# Patient Record
Sex: FEMALE | Race: WHITE | Employment: FULL TIME | URBAN - METROPOLITAN AREA
[De-identification: names, ages, dates, MRNs, and addresses within clinical notes are randomized per-mention and may not be internally consistent; named-entity substitution may affect disease eponyms.]

---

## 2021-01-08 ENCOUNTER — HOSPITAL ENCOUNTER (EMERGENCY)
Age: 36
Discharge: HOME | End: 2021-01-08
Attending: EMERGENCY MEDICINE
Payer: COMMERCIAL

## 2021-01-08 VITALS
HEART RATE: 94 BPM | OXYGEN SATURATION: 98 % | WEIGHT: 160 LBS | DIASTOLIC BLOOD PRESSURE: 83 MMHG | SYSTOLIC BLOOD PRESSURE: 129 MMHG | TEMPERATURE: 98.6 F | BODY MASS INDEX: 32.25 KG/M2 | HEIGHT: 59 IN | RESPIRATION RATE: 14 BRPM
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
## 2021-01-08 DIAGNOSIS — S39.92XA INJURY OF COCCYX, INITIAL ENCOUNTER: Primary | ICD-10-CM

## 2021-01-08 PROCEDURE — 99283 EMERGENCY DEPT VISIT LOW MDM: CPT

## 2021-01-08 RX ORDER — METHOCARBAMOL 750 MG/1
750 TABLET, FILM COATED ORAL 4 TIMES DAILY
Qty: 40 TABLET | Refills: 0 | Status: SHIPPED | OUTPATIENT
Start: 2021-01-08 | End: 2021-01-18

## 2021-01-08 RX ORDER — HYDROCODONE BITARTRATE AND ACETAMINOPHEN 5; 325 MG/1; MG/1
1 TABLET ORAL EVERY 8 HOURS PRN
Qty: 5 TABLET | Refills: 0 | Status: SHIPPED | OUTPATIENT
Start: 2021-01-08 | End: 2021-01-11

## 2021-01-08 RX ORDER — IBUPROFEN 600 MG/1
600 TABLET ORAL EVERY 6 HOURS PRN
Qty: 120 TABLET | Refills: 0 | Status: SHIPPED | OUTPATIENT
Start: 2021-01-08 | End: 2022-10-10

## 2021-01-08 RX ORDER — IBUPROFEN 800 MG/1
800 TABLET ORAL EVERY 6 HOURS PRN
COMMUNITY
End: 2021-01-08

## 2021-01-08 ASSESSMENT — PAIN DESCRIPTION - PAIN TYPE: TYPE: ACUTE PAIN

## 2021-01-08 NOTE — ED PROVIDER NOTES
Emergency Department Encounter    Patient: Sal Frye  MRN: [id number]  : 1985  Date of Evaluation: 2021  ED Provider:  Felisa Rodrigez    Triage Chief Complaint:   Fall and Tailbone Pain    [place]:  Sal Frye is a 37 y.o. female that presents with tailbone pain. Patient states that several days ago she fell down the stairs after slipping on the carpet and went down multiple stairs on her bottom. Denies hitting her head or any loss consciousness. Is complaining of pain to her tailbone area when she sits or puts pressure on that area. Denies any pain anywhere else. ROS - see HPI, below listed is current ROS at time of my eval:  General:  No fevers, no chills, no weakness  Eyes:  No recent vison changes, no discharge  ENT:  No sore throat, no nasal congestion, no hearing changes  Cardiovascular:  No chest pain, no palpitations  Respiratory:  No shortness of breath, no cough, no wheezing  Gastrointestinal:  No pain, no nausea, no vomiting, no diarrhea  Musculoskeletal:  + tail bone pain  Skin:  No rash, no pruritis, no easy bruising  Neurologic:  No speech problems, no headache, no extremity numbness, no extremity tingling, no extremity weakness  Psychiatric:  No anxiety  Genitourinary:  No dysuria, no hematuria  Endocrine:  No unexpected weight gain, no unexpected weight loss  Extremities:  no edema, no pain    Past Medical History:   Diagnosis Date    Heart murmur      Past Surgical History:   Procedure Laterality Date    BRAIN SURGERY       History reviewed. No pertinent family history.   Social History     Socioeconomic History    Marital status: Single     Spouse name: Not on file    Number of children: Not on file    Years of education: Not on file    Highest education level: Not on file   Occupational History    Not on file   Social Needs    Financial resource strain: Not on file    Food insecurity     Worry: Not on file     Inability: Not on file   [employer] needs Medical: Not on file     Non-medical: Not on file   Tobacco Use    Smoking status: Current Every Day Smoker     Packs/day: 0.50    Smokeless tobacco: Never Used   Substance and Sexual Activity    Alcohol use: Yes     Comment: SOCIAL    Drug use: Never    Sexual activity: Not on file   Lifestyle    Physical activity     Days per week: Not on file     Minutes per session: Not on file    Stress: Not on file   Relationships    Social connections     Talks on phone: Not on file     Gets together: Not on file     Attends Yazidi service: Not on file     Active member of club or organization: Not on file     Attends meetings of clubs or organizations: Not on file     Relationship status: Not on file    Intimate partner violence     Fear of current or ex partner: Not on file     Emotionally abused: Not on file     Physically abused: Not on file     Forced sexual activity: Not on file   Other Topics Concern    Not on file   Social History Narrative    Not on file     No current facility-administered medications for this encounter. Current Outpatient Medications   Medication Sig Dispense Refill    CITALOPRAM HYDROBROMIDE PO Take by mouth      ARIPiprazole (ABILIFY PO) Take by mouth      ibuprofen (ADVIL;MOTRIN) 800 MG tablet Take 800 mg by mouth every 6 hours as needed for Pain       Allergies   Allergen Reactions    Codeine        Nursing Notes Reviewed    Physical Exam:  Triage VS:    ED Triage Vitals [01/08/21 1412]   Enc Vitals Group      /83      Pulse 94      Resp 14      Temp 98.6 °F (37 °C)      Temp src       SpO2 98 %      Weight 160 lb (72.6 kg)      Height 4' 11\" (1.499 m)      Head Circumference       Peak Flow       Pain Score       Pain Loc       Pain Edu? Excl. in [address]? My pulse ox interpretation is - normal    General appearance:  No acute distress. Skin:  Warm. Dry. Eye:  Extraocular movements intact.      Ears, nose, mouth and throat:  Oral mucosa moist   Neck: Trachea midline. Head is atraumatic. Extremity:  No swelling. Normal ROM     Heart:  Regular rate and rhythm, normal S1 & S2, no extra heart sounds. Perfusion:  intact  Respiratory:  Lungs clear to auscultation bilaterally. Respirations nonlabored. Abdominal:  Normal bowel sounds. Soft. Nontender. Non distended. Back:  No CVA tenderness to palpation , no point tenderness of the spine, tenderness to the coccyx area with no sign rounding ecchymoses or swelling. Neurological:  Alert and oriented times 3. No focal neuro deficits. Psychiatric:  Appropriate    I have reviewed and interpreted all of the currently available lab results from this visit (if applicable):  No results found for this visit on 01/08/21. Radiographs (if obtained):  Radiologist's Report Reviewed:  No results found. EKG (if obtained): (All EKG's are interpreted by myself in the absence of a cardiologist)      MDM:  Patient presents with complaint of pain to her tailbone area. Has tenderness palpation of the coccyx with no sign of swelling or ecchymoses. Patient is well-appearing, nontoxic. Has no point tenderness to her spine. No neurologic deficits. Did not hit her head or any loss of consciousness. Patient will be treated symptomatically. Told to buy a flotation device from the store to uses a donut ring to relieve the pressure to her coccyx area while sitting. Clinical Impression:  1. Injury of coccyx, initial encounter      Disposition referral (if applicable):  No follow-up provider specified.   Disposition medications (if applicable):  New Prescriptions    No medications on file     ED Provider Disposition Time  DISPOSITION Decision To Discharge 01/08/2021 02:27:56 PM      Comment: Please note this report has been produced using speech recognition software and may contain errors related to that system including errors in grammar, punctuation, and spelling, as well as words and phrases that may be inappropriate. Efforts were made to edit the dictations.        Betty Ulloa MD  01/08/21 Rene [place]

## 2021-07-04 ENCOUNTER — HOSPITAL ENCOUNTER (EMERGENCY)
Age: 36
Discharge: HOME | End: 2021-07-04
Attending: EMERGENCY MEDICINE
Payer: COMMERCIAL

## 2021-07-04 VITALS
HEART RATE: 85 BPM | TEMPERATURE: 98.2 F | HEIGHT: 55 IN | RESPIRATION RATE: 17 BRPM | SYSTOLIC BLOOD PRESSURE: 141 MMHG | DIASTOLIC BLOOD PRESSURE: 57 MMHG | BODY MASS INDEX: 4499.69 KG/M2

## 2021-07-04 DIAGNOSIS — R59.0 LYMPHADENOPATHY OF RIGHT CERVICAL REGION: Primary | ICD-10-CM

## 2021-07-04 PROCEDURE — 99284 EMERGENCY DEPT VISIT MOD MDM: CPT

## 2021-07-04 ASSESSMENT — PAIN SCALES - GENERAL: PAINLEVEL_OUTOF10: 10

## 2021-07-04 ASSESSMENT — PAIN DESCRIPTION - LOCATION: LOCATION: NECK

## 2021-07-04 NOTE — ED PROVIDER NOTES
EMERGENCY DEPARTMENT ENCOUNTER      CHIEF COMPLAINT:   Lump on posterior neck    HPI: Ashwin Villalobos is a 31 y.o. female who presents to the emergency department for evaluation of a painful lump on the back of her right neck just below the hairline. The patient states she noticed it 2 days ago. It is painful. She denies any trauma to the area. There is no drainage or redness. She denies any scalp injuries or pimples. She does not know what the lump is. She is concerned that it could be an abscess. She states it is achy and throbbing. The pain is constant. It is worse if you touch it and better if you do not. She denies fevers, chills, chest pain, shortness of breath, throat pain, cough, vomiting or any other complaints. REVIEW OF SYSTEMS:   Constitutional:  Denies fever or chills  Eyes:  Denies change in visual acuity  HENT: See HPI  Respiratory:  Denies cough or shortness of breath  Cardiovascular:  Denies chest pain or edema  GI:  Denies abdominal pain, nausea, vomiting, bloody stools or diarrhea  :  Denies dysuria  Musculoskeletal:  Denies back pain or joint pain  Integument:  Denies rash  Neurologic:  Denies headache, focal weakness or sensory changes  \"Remaining review of systems reviewed and negative. I have reviewed the nursing triage documentation and agree unless otherwise noted below. \"      PAST MEDICAL HISTORY:   Past Medical History:   Diagnosis Date    Heart murmur        CURRENT MEDICATIONS:   Home medications reviewed. SURGICAL HISTORY:   Past Surgical History:   Procedure Laterality Date    BRAIN SURGERY         FAMILY HISTORY:   No family history on file.     SOCIAL HISTORY:   Social History     Socioeconomic History    Marital status: Single     Spouse name: Not on file    Number of children: Not on file    Years of education: Not on file    Highest education level: Not on file   Occupational History    Not on file   Tobacco Use    Smoking status: Current Every Day Smoker     Packs/day: 0.50    Smokeless tobacco: Never Used   Substance and Sexual Activity    Alcohol use: Yes     Comment: SOCIAL    Drug use: Never    Sexual activity: Not on file   Other Topics Concern    Not on file   Social History Narrative    Not on file     Social Determinants of Health     Financial Resource Strain:     Difficulty of Paying Living Expenses:    Food Insecurity:     Worried About Running Out of Food in the Last Year:     [address] in the Last Year:    Transportation Needs:     Lack of Transportation (Medical):  Lack of Transportation (Non-Medical):    Physical Activity:     Days of Exercise per Week:     Minutes of Exercise per Session:    Stress:     Feeling of Stress :    Social Connections:     Frequency of Communication with Friends and Family:     Frequency of Social Gatherings with Friends and Family:     Attends Christianity Services:     Active Member of Clubs or Organizations:     Attends Club or Organization Meetings:     Marital Status:    Intimate Partner Violence:     Fear of Current or Ex-Partner:     Emotionally Abused:     Physically Abused:     Sexually Abused: ALLERGIES: Codeine    PHYSICAL EXAM:  VITAL SIGNS:   ED Triage Vitals [07/04/21 1208]   Enc Vitals Group      BP (!) 141/57      Pulse 85      Resp 17      Temp 98.2 °F (36.8 °C)      Temp src       SpO2       Weight       Height (!) 5\" (0.127 m)      Head Circumference       Peak Flow       Pain Score       Pain Loc       Pain Edu? Excl. in [address]? Constitutional:  Non-toxic appearance  HENT: Normocephalic, Atraumatic, Bilateral external ears normal, Oropharynx moist, No oral exudates, Nose normal.  Eyes:  PERRL, EOMI, Conjunctiva normal, No discharge.   Neck: Normal range of motion, No tenderness, Supple, No stridor, there is a tender occipital lymph node on the right, no abscess, no cellulitis, no drainage  Cardiovascular:  Normal heart rate, Normal clarification.         Brendolyn Cushing, MD  07/04/21 2009

## 2021-07-04 NOTE — ED NOTES
Assessment completed as charted no S/S of distress noted at this time. We will continue to monitor.        Joy Tabares RN  07/04/21 [id number]

## 2021-09-16 ENCOUNTER — HOSPITAL ENCOUNTER (EMERGENCY)
Age: 36
Discharge: HOME | End: 2021-09-16
Attending: EMERGENCY MEDICINE
Payer: COMMERCIAL

## 2021-09-16 VITALS
DIASTOLIC BLOOD PRESSURE: 98 MMHG | RESPIRATION RATE: 15 BRPM | OXYGEN SATURATION: 94 % | SYSTOLIC BLOOD PRESSURE: 156 MMHG | HEIGHT: 60 IN | TEMPERATURE: 97.7 F | HEART RATE: 93 BPM | WEIGHT: 186 LBS | BODY MASS INDEX: 36.52 KG/M2

## 2021-09-16 DIAGNOSIS — J06.9 VIRAL URI WITH COUGH: Primary | ICD-10-CM

## 2021-09-16 PROCEDURE — U0005 INFEC AGEN DETEC AMPLI PROBE: HCPCS

## 2021-09-16 PROCEDURE — U0003 INFECTIOUS AGENT DETECTION BY NUCLEIC ACID (DNA OR RNA); SEVERE ACUTE RESPIRATORY SYNDROME CORONAVIRUS 2 (SARS-COV-2) (CORONAVIRUS DISEASE [COVID-19]), AMPLIFIED PROBE TECHNIQUE, MAKING USE OF HIGH THROUGHPUT TECHNOLOGIES AS DESCRIBED BY CMS-2020-01-R: HCPCS

## 2021-09-16 PROCEDURE — 6360000002 HC RX W HCPCS: Performed by: EMERGENCY MEDICINE

## 2021-09-16 PROCEDURE — 6370000000 HC RX 637 (ALT 250 FOR IP): Performed by: EMERGENCY MEDICINE

## 2021-09-16 PROCEDURE — 99283 EMERGENCY DEPT VISIT LOW MDM: CPT

## 2021-09-16 PROCEDURE — 96372 THER/PROPH/DIAG INJ SC/IM: CPT

## 2021-09-16 RX ORDER — LAMOTRIGINE 25 MG/1
TABLET ORAL
COMMUNITY
Start: 2021-04-19 | End: 2022-10-10

## 2021-09-16 RX ORDER — KETOROLAC TROMETHAMINE 30 MG/ML
15 INJECTION, SOLUTION INTRAMUSCULAR; INTRAVENOUS ONCE
Status: COMPLETED | OUTPATIENT
Start: 2021-09-16 | End: 2021-09-16

## 2021-09-16 RX ORDER — OMEPRAZOLE 40 MG/1
CAPSULE, DELAYED RELEASE ORAL
COMMUNITY
Start: 2021-08-23 | End: 2022-10-10

## 2021-09-16 RX ORDER — ACETAMINOPHEN 500 MG
1000 TABLET ORAL ONCE
Status: COMPLETED | OUTPATIENT
Start: 2021-09-16 | End: 2021-09-16

## 2021-09-16 RX ORDER — DEXAMETHASONE 4 MG/1
4 TABLET ORAL ONCE
Status: COMPLETED | OUTPATIENT
Start: 2021-09-16 | End: 2021-09-16

## 2021-09-16 RX ORDER — DESVENLAFAXINE 25 MG/1
25 TABLET, EXTENDED RELEASE ORAL DAILY
COMMUNITY
Start: 2021-03-18 | End: 2022-10-10

## 2021-09-16 RX ORDER — OXYBUTYNIN CHLORIDE 5 MG/1
TABLET, EXTENDED RELEASE ORAL
COMMUNITY
Start: 2021-08-07 | End: 2022-10-10

## 2021-09-16 RX ADMIN — ACETAMINOPHEN 1000 MG: 500 TABLET ORAL at 12:08

## 2021-09-16 RX ADMIN — DEXAMETHASONE 4 MG: 4 TABLET ORAL at 12:07

## 2021-09-16 RX ADMIN — KETOROLAC TROMETHAMINE 15 MG: 30 INJECTION, SOLUTION INTRAMUSCULAR; INTRAVENOUS at 12:07

## 2021-09-16 ASSESSMENT — PAIN SCALES - GENERAL
PAINLEVEL_OUTOF10: 7
PAINLEVEL_OUTOF10: 6

## 2021-09-16 NOTE — ED NOTES
Patient discharged without prescriptions; education of medications reviewed. Pt verbalized understanding of discharge instructions. All questions answered and patient understands follow up instructions.        Maximilian Bates RN  09/16/21 [id number]

## 2021-09-16 NOTE — ED TRIAGE NOTES
Pt arrived via triage with c/o cough, congestion, sore throat and not feeling well. Pt is alert, oriented and ambulatory. Pt respirations are even and unlabored. Pt skin is warm, dry and intact.

## 2021-09-16 NOTE — ED PROVIDER NOTES
Emergency Department Encounter    Patient: Rashi Dias  MRN: [id number]  : 1985  Date of Evaluation: 2021  ED Provider:  Jose Blulard MD    Triage Chief Complaint:   Cough and Nasal Congestion    [place]:  Rashi Dias is a 31 y.o. female that presents with cough, congestion, runny nose, sore throat, general malaise x2 days. No fever or chills. No shortness of breath. Patient smokes approximate 1 pack/day. Patient reports that she is down to just 1 or 2 cigarettes/day due to her symptoms. No chest pain. Patient denies any recent exposure to anybody with Covid-19. Patient is not immunized. ROS - see HPI, below listed is current ROS at time of my eval:  General:  No fevers, + chills, no weakness  Eyes:  No recent vison changes, no discharge  ENT:  + sore throat, + nasal congestion, no hearing changes  Cardiovascular:  No chest pain, no palpitations  Respiratory:  No shortness of breath, + cough, no wheezing  Gastrointestinal:  No pain, no nausea, no vomiting, no diarrhea  Musculoskeletal:  No muscle pain  Skin:  No rash,   Neurologic:  No speech problems, no headache  Genitourinary:  No dysuria  Extremities:  no edema, no pain    Past Medical History:   Diagnosis Date    Heart murmur      Past Surgical History:   Procedure Laterality Date    BRAIN SURGERY       History reviewed. No pertinent family history.   Social History     Socioeconomic History    Marital status: Single     Spouse name: Not on file    Number of children: Not on file    Years of education: Not on file    Highest education level: Not on file   Occupational History    Not on file   Tobacco Use    Smoking status: Current Every Day Smoker     Packs/day: 0.50    Smokeless tobacco: Never Used   Substance and Sexual Activity    Alcohol use: Yes     Comment: SOCIAL    Drug use: Never    Sexual activity: Not on file   Other Topics Concern    Not on file   Social History Narrative    Not on file Social Determinants of Health     Financial Resource Strain:     Difficulty of Paying Living Expenses:    Food Insecurity:     Worried About Running Out of Food in the Last Year:     [address] in the Last Year:    Transportation Needs:     Lack of Transportation (Medical):      Lack of Transportation (Non-Medical):    Physical Activity:     Days of Exercise per Week:     Minutes of Exercise per Session:    Stress:     Feeling of Stress :    Social Connections:     Frequency of Communication with Friends and Family:     Frequency of Social Gatherings with Friends and Family:     Attends Adventism Services:     Active Member of Clubs or Organizations:     Attends Club or Organization Meetings:     Marital Status:    Intimate Partner Violence:     Fear of Current or Ex-Partner:     Emotionally Abused:     Physically Abused:     Sexually Abused:      Current Facility-Administered Medications   Medication Dose Route Frequency Provider Last Rate Last Admin    acetaminophen (TYLENOL) tablet 1,000 mg  1,000 mg Oral Once Concetta Lewis MD        ketorolac (TORADOL) injection 15 mg  15 mg IntraMUSCular Once Concetta Lewis MD        dexamethasone (DECADRON) tablet 4 mg  4 mg Oral Once Concetta Lewis MD         Current Outpatient Medications   Medication Sig Dispense Refill    desvenlafaxine succinate (PRISTIQ) 25 MG TB24 extended release tablet Take 25 mg by mouth daily      lamoTRIgine (LAMICTAL) 25 MG tablet       oxybutynin (DITROPAN-XL) 5 MG extended release tablet TAKE 1 TABLET BY MOUTH EVERY DAY      omeprazole (PRILOSEC) 40 MG delayed release capsule TAKE 1 CAPSULE BY MOUTH EVERY DAY      CITALOPRAM HYDROBROMIDE PO Take by mouth      ARIPiprazole (ABILIFY PO) Take by mouth      ibuprofen (IBU) 600 MG tablet Take 1 tablet by mouth every 6 hours as needed for Pain 120 tablet 0     Allergies   Allergen Reactions    Codeine        Nursing Notes Reviewed    Physical Exam:  Triage VS: in time range)      patient understands that at this time there is no evidence for another underlying process, however that early in the process of any illness or infection an initial workup/presentation can be falsely reassuring/negative. Based on history, physical exam and discussion with patient, the patient will be treated symptomatically and will be discharged home. An outpatient Covid-19 test was sent. Patient was instructed on symptomatic treatment, monitoring and outpatient followup. They understand and agree with the plan, return warnings given. We have discussed the symptoms which are most concerning that necessitate immediate return. Clinical Impression:  1. Viral URI with cough      Disposition referral (if applicable):  No follow-up provider specified. Disposition medications (if applicable):  New Prescriptions    No medications on file     ED Provider Disposition Time  DISPOSITION      Patient Instructions                 You were evaluated for signs and symptoms consistent with an upper respiratory tract infection.  The symptoms could also be consistent with Covid-19.  I do recommend self-isolation.  An outpatient Covid-19 test was sent which will be available on [facility] in the next 24 to 48 hours. I do recommend over-the-counter acetaminophen 1000 mg every 8 hours as needed for pain/general malaise/fever. Do not take more than 4000 mg in a 24-hour period.  You can also take over-the-counter ibuprofen 600 mg every 8 hours as needed for pain/general malaise/fever.  Do not take ibuprofen with any other nonsteroidal anti-inflammatory such as naproxen sodium/Aleve. Return to the emergency department for any medical concerns. Comment: Please note this report has been produced using speech recognition software and may contain errors related to that system including errors in grammar, punctuation, and spelling, as well as words and phrases that may be inappropriate.   Efforts were made to edit the dictations.         Diane Guerra MD  09/16/21 [id number]

## 2021-09-17 ENCOUNTER — CARE COORDINATION (OUTPATIENT)
Dept: CARE COORDINATION | Age: 36
End: 2021-09-17

## 2021-09-17 LAB
SARS-COV-2: DETECTED
SOURCE: ABNORMAL

## 2021-09-17 NOTE — CARE COORDINATION
Patient contacted regarding COVID-19 diagnosis. Discussed COVID-19 related testing which was pending at this time. Test results were pending. Patient informed of results, if available? Yes. Ambulatory Care Manager contacted the patient by telephone to perform post discharge assessment. Call within 2 business days of discharge: Yes. Verified name and  with patient as identifiers. Provided introduction to self, and explanation of the CTN/ACM role, and reason for call due to risk factors for infection and/or exposure to COVID-19. Symptoms reviewed with patient who verbalized the following symptoms: cough. Due to no new or worsening symptoms encounter was not routed to provider for escalation. Discussed follow-up appointments. If no appointment was previously scheduled, appointment scheduling offered: Yes. [facility] follow up appointment(s): No future appointments. Non-[facility] follow up appointment(s): will call     Non-face-to-face services provided:  Reviewed and followed up on pending diagnostic tests and treatments-covid      Advance Care Planning:   Does patient have an Advance Directive:  reviewed and current. Educated patient about risk for severe COVID-19 due to risk factors according to CDC guidelines. ACM reviewed discharge instructions, medical action plan and red flag symptoms with the patient who verbalized understanding. Discussed COVID vaccination status: Yes. Education provided on COVID-19 vaccination as appropriate. Discussed exposure protocols and quarantine with CDC Guidelines. Patient was given an opportunity to verbalize any questions and concerns and agrees to contact ACM or health care provider for questions related to their healthcare. Reviewed and educated patient on any new and changed medications related to discharge diagnosis     Was patient discharged with a pulse oximeter?  No Discussed and confirmed pulse oximeter discharge instructions and when to notify provider or seek emergency care. ACM provided contact information. No further follow-up call identified based on severity of symptoms and risk factors. Spoke with pt who said she still has cough she will call her pcp to her follow. Spoke with pt who said she is doing a little better she is trying to get on mychart encouraged her to do so to get her results. She does have a pcp she can follow up with. She is considering getting vaccinated.

## 2021-09-17 NOTE — ACP (ADVANCE CARE PLANNING)
Advance Care Planning   Healthcare Decision Maker:He        Click here to complete Healthcare Decision Makers including selection of the Healthcare Decision Maker Relationship (ie \"Primary\"). Today we documented Decision Maker(s) consistent with Legal Next of Kin hierarchy.

## 2021-09-28 ENCOUNTER — HOSPITAL ENCOUNTER (EMERGENCY)
Age: 36
Discharge: HOME | End: 2021-09-28
Attending: EMERGENCY MEDICINE
Payer: COMMERCIAL

## 2021-09-28 ENCOUNTER — APPOINTMENT (OUTPATIENT)
Dept: GENERAL RADIOLOGY | Age: 36
End: 2021-09-28
Payer: COMMERCIAL

## 2021-09-28 VITALS
BODY MASS INDEX: 37.69 KG/M2 | OXYGEN SATURATION: 93 % | RESPIRATION RATE: 18 BRPM | WEIGHT: 192 LBS | SYSTOLIC BLOOD PRESSURE: 116 MMHG | HEART RATE: 101 BPM | TEMPERATURE: 97.1 F | DIASTOLIC BLOOD PRESSURE: 82 MMHG | HEIGHT: 60 IN

## 2021-09-28 DIAGNOSIS — R06.00 DYSPNEA, UNSPECIFIED TYPE: ICD-10-CM

## 2021-09-28 DIAGNOSIS — F10.920 ACUTE ALCOHOLIC INTOXICATION WITHOUT COMPLICATION (HCC): Primary | ICD-10-CM

## 2021-09-28 LAB
EKG ATRIAL RATE: 103 BPM
EKG DIAGNOSIS: NORMAL
EKG P AXIS: 66 DEGREES
EKG P-R INTERVAL: 142 MS
EKG Q-T INTERVAL: 344 MS
EKG QRS DURATION: 78 MS
EKG QTC CALCULATION (BAZETT): 450 MS
EKG R AXIS: 50 DEGREES
EKG T AXIS: 54 DEGREES
EKG VENTRICULAR RATE: 103 BPM

## 2021-09-28 PROCEDURE — 93010 ELECTROCARDIOGRAM REPORT: CPT | Performed by: INTERNAL MEDICINE

## 2021-09-28 PROCEDURE — 71046 X-RAY EXAM CHEST 2 VIEWS: CPT

## 2021-09-28 PROCEDURE — 93005 ELECTROCARDIOGRAM TRACING: CPT | Performed by: EMERGENCY MEDICINE

## 2021-09-28 PROCEDURE — 99283 EMERGENCY DEPT VISIT LOW MDM: CPT

## 2021-09-28 ASSESSMENT — PAIN DESCRIPTION - FREQUENCY: FREQUENCY: CONTINUOUS

## 2021-09-28 ASSESSMENT — PAIN DESCRIPTION - ORIENTATION: ORIENTATION: UPPER

## 2021-09-28 ASSESSMENT — PAIN DESCRIPTION - ONSET: ONSET: ON-GOING

## 2021-09-28 ASSESSMENT — PAIN SCALES - GENERAL: PAINLEVEL_OUTOF10: 5

## 2021-09-28 ASSESSMENT — PAIN DESCRIPTION - LOCATION: LOCATION: BACK;NECK

## 2021-09-28 ASSESSMENT — PAIN DESCRIPTION - PAIN TYPE: TYPE: ACUTE PAIN

## 2021-09-28 NOTE — ED TRIAGE NOTES
Pt arrived via EMS for c/o shortness of breath d/t COVID-19. Pt reports being dx two weeks ago with COVID-19. Pt reports to drinking unknown amount of alcoholic beverages last night and is still intoxicated. Pt is A&O and reports pain in her neck and upper back 5/10 on pain scale.

## 2021-09-28 NOTE — ED NOTES
Pt called for a ride home and states they are on their way  Discharge instructions given  verbalized understanding pt is ambulatory out        Misty Ricci RN  09/28/21 [id number]

## 2021-09-28 NOTE — ED PROVIDER NOTES
Emergency Department Encounter    Patient: Sarai Maravilla  MRN: [id number]  : 1985  Date of Evaluation: 2021  ED Provider:  Christopher Bailey MD    Triage Chief Complaint:   Shortness of Breath      [place]:  Sarai Maravilla is a 31 y.o. female that presents with SOB and alcohol intoxication. Patient smokes 1/2 Pack per day. No chest pain, Abdominal pain, nausea, vomiting or diarrhea. No history of pulmonary embolus. Patient denies any lower extremity edema or pain. No history of DVT or PE. No recent trauma or surgeries. No recent long periods of inactivity. She denies being on any hormone replacement therapy or contraception. Patient denies any cocaine use or abuse. No methamphetamine use or abuse. Patient reports she is only had mild improvement since being diagnosed with Covid-19. Patient denies any fever or chills. ROS - see HPI, below listed is current ROS at time of my eval:  General:  No fevers  Heart: No chest pain lungs:  Lungs: No shortness of breath no cough  Abdomen: No abdominal pain, nausea, vomiting or diarrhea  Musculoskeletal: Right foot pain. Skin:  No rash, no pruritis, no easy bruising  Neurologic:   no extremity numbness, no extremity tingling, no extremity weakness  Extremities: No edema  : No pain with urination no increase in urgency or frequency of urination  Psych: No anxiety or depression; no suicidal ideations or homicidal ideations      Past Medical History:   Diagnosis Date    Heart murmur      Past Surgical History:   Procedure Laterality Date    BRAIN SURGERY       History reviewed. No pertinent family history.   Social History     Socioeconomic History    Marital status: Single     Spouse name: Not on file    Number of children: Not on file    Years of education: Not on file    Highest education level: Not on file   Occupational History    Not on file   Tobacco Use    Smoking status: Current Every Day Smoker     Packs/day: 0.50     Types: Cigarettes    Smokeless tobacco: Never Used   Vaping Use    Vaping Use: Never used   Substance and Sexual Activity    Alcohol use: Yes     Comment: SOCIAL    Drug use: Yes     Types: Marijuana    Sexual activity: Not on file   Other Topics Concern    Not on file   Social History Narrative    Not on file     Social Determinants of Health     Financial Resource Strain:     Difficulty of Paying Living Expenses:    Food Insecurity:     Worried About Running Out of Food in the Last Year:     [address] in the Last Year:    Transportation Needs:     Lack of Transportation (Medical):  Lack of Transportation (Non-Medical):    Physical Activity:     Days of Exercise per Week:     Minutes of Exercise per Session:    Stress:     Feeling of Stress :    Social Connections:     Frequency of Communication with Friends and Family:     Frequency of Social Gatherings with Friends and Family:     Attends Holiness Services:     Active Member of Clubs or Organizations:     Attends Club or Organization Meetings:     Marital Status:    Intimate Partner Violence:     Fear of Current or Ex-Partner:     Emotionally Abused:     Physically Abused:     Sexually Abused:      No current facility-administered medications for this encounter.      Current Outpatient Medications   Medication Sig Dispense Refill    desvenlafaxine succinate (PRISTIQ) 25 MG TB24 extended release tablet Take 25 mg by mouth daily      lamoTRIgine (LAMICTAL) 25 MG tablet       oxybutynin (DITROPAN-XL) 5 MG extended release tablet TAKE 1 TABLET BY MOUTH EVERY DAY      omeprazole (PRILOSEC) 40 MG delayed release capsule TAKE 1 CAPSULE BY MOUTH EVERY DAY      CITALOPRAM HYDROBROMIDE PO Take by mouth      ARIPiprazole (ABILIFY PO) Take by mouth      ibuprofen (IBU) 600 MG tablet Take 1 tablet by mouth every 6 hours as needed for Pain 120 tablet 0     Allergies   Allergen Reactions    Codeine        Nursing Notes Reviewed    Physical Exam:  Triage VS:    ED Triage Vitals [09/28/21 0539]   Enc Vitals Group      /87      Pulse 110      Resp 20      Temp 97.1 °F (36.2 °C)      Temp Source Infrared      SpO2 95 %      Weight 192 lb (87.1 kg)      Height 5' (1.524 m)      Head Circumference       Peak Flow       Pain Score       Pain Loc       Pain Edu? Excl. in [address]? My pulse ox interpretation is - normal    General appearance:  Uncooperative  Skin:  Warm. Dry. Eye:  Extraocular movements intact. Bilateral conjunctival injection noted, pupils equal, round, slightly dilated  Ears, nose, mouth and throat:  Oral mucosa moist   Neck:  Trachea midline. Extremity:  No swelling. Normal ROM     Heart:  Regular rate and rhythm  Perfusion:  intact   Respiratory:  Lungs clear to auscultation bilaterally. Respirations nonlabored. Abdominal:  Normal bowel sounds. Soft. Nontender. Non distended. Neurological:  Alert. Moves all extremities equally, cranial nerves grossly intact, smells of alcohol, slurring speech, no facial droop  Psychiatric:  Uncooperative, belligerent and threatening to staff    I have reviewed and interpreted all of the currently available lab results from this visit (if applicable):  Results for orders placed or performed during the hospital encounter of 09/28/21   EKG 12 Lead   Result Value Ref Range    Ventricular Rate 103 BPM    Atrial Rate 103 BPM    P-R Interval 142 ms    QRS Duration 78 ms    Q-T Interval 344 ms    QTc Calculation (Bazett) 450 ms    P Axis 66 degrees    R Axis 50 degrees    T Axis 54 degrees    Diagnosis       Sinus tachycardia  Otherwise normal ECG  No previous ECGs available        Radiographs (if obtained):  Radiologist's Report Reviewed:  No results found. EKG done at 0 612  Rate 103  Normal axis  Normal intervals  No specific ST or T wave changes    Sinus tachycardia rate 103 with no specific ST or T wave changes.     MDM:  Patient presenting Via EMS with alcohol intoxication; Patient reports she was Diagnosed with COVID-19 on 9/16/2021. She had some SOB while falling asleep. On evaluation the patient reported vague shortness of breath while she was trying to fall asleep. She is tearful and says that she has increased stress but denies  suicidal, homicidal, or any threat to harming themselves or others. There is no evidence of trauma. The patient is not an acute threat to themselves or others. Chest x-ray shows no acute cardiopulmonary process. EKG shows no acute findings. I do not feel she has signs of PE or other concerning pulmonary process. She is not hypoxic. Patient is scheduled for a repeat Covid at [facility] today. She was monitored for an extended period of time for complications and discharged with her /sober safe ride. Clinical Impression:  1. Acute alcoholic intoxication without complication ([address]    2. Dyspnea, unspecified type      Disposition referral (if applicable):  No follow-up provider specified. Disposition medications (if applicable):  New Prescriptions    No medications on file     ED Provider Disposition Time  DISPOSITION        Comment: Please note this report has been produced using speech recognition software and may contain errors related to that system including errors in grammar, punctuation, and spelling, as well as words and phrases that may be inappropriate. Efforts were made to edit the dictations.           Jase Alarcon MD  09/28/21 5804

## 2021-09-29 ENCOUNTER — CARE COORDINATION (OUTPATIENT)
Dept: CARE COORDINATION | Age: 36
End: 2021-09-29

## 2021-09-30 ENCOUNTER — CARE COORDINATION (OUTPATIENT)
Dept: CARE COORDINATION | Age: 36
End: 2021-09-30

## 2021-09-30 NOTE — CARE COORDINATION
[facility] ED Follow Up Call    2021    Patient: Alia Boogie Patient : 1985   MRN: <[id number]>  Reason for Admission:   COVID+  Discharge Date: 21      Patient contacted regarding COVID-19 diagnosis. Discussed COVID-19 related testing which was available at this time. Test results were positive. Patient informed of results, if available? Yes. Ambulatory Care Manager contacted the patient by telephone to perform post discharge assessment. Call within 2 business days of discharge: Yes. Verified name and  with patient as identifiers. Provided introduction to self, and explanation of the CTN/ACM role, and reason for call due to risk factors for infection and/or exposure to COVID-19. Symptoms reviewed with patient who verbalized the following symptoms: fatigue and cough. Reports that she is feeling better. Encouraged rest/ hydration. Reviewed s/s to report to MD/Jesus. Due to no new or worsening symptoms encounter was not routed to provider for escalation. Discussed follow-up appointments. If no appointment was previously scheduled, appointment scheduling offered: Yes. [facility] follow up appointment(s): No future appointments. Non-[facility] follow up appointment(s):  Patient verbalized plan to follow up. Non-face-to-face services provided:  Education of patient/family/caregiver/guardian to support self-management-1     Advance Care Planning:   Does patient have an Advance Directive:  not on file. Educated patient about risk for severe COVID-19 due to risk factors according to CDC guidelines. ACM reviewed discharge instructions, medical action plan and red flag symptoms with the patient who verbalized understanding. Discussed COVID vaccination status: Yes. Education provided on COVID-19 vaccination as appropriate. Discussed exposure protocols and quarantine with CDC Guidelines.  Patient was given an opportunity to verbalize any questions and concerns and agrees to contact ACM or health care provider for questions related to their healthcare. Reviewed and educated patient on any new and changed medications related to discharge diagnosis. No new medications noted. Was patient discharged with a pulse oximeter? No.  Discussed discharge instructions and when to notify provider or seek emergency care. Patient denies any questions or concerns. ACM provided contact information. No further follow-up call identified based on severity of symptoms and risk factors.        Care Transitions ED Follow Up    Care Transitions Interventions

## 2021-11-10 ENCOUNTER — HOSPITAL ENCOUNTER (EMERGENCY)
Age: 36
Discharge: HOME | End: 2021-11-10
Attending: EMERGENCY MEDICINE
Payer: COMMERCIAL

## 2021-11-10 VITALS
OXYGEN SATURATION: 97 % | TEMPERATURE: 97 F | BODY MASS INDEX: 35.34 KG/M2 | SYSTOLIC BLOOD PRESSURE: 170 MMHG | HEIGHT: 60 IN | DIASTOLIC BLOOD PRESSURE: 112 MMHG | RESPIRATION RATE: 16 BRPM | WEIGHT: 180 LBS | HEART RATE: 64 BPM

## 2021-11-10 DIAGNOSIS — R03.0 ELEVATED BLOOD PRESSURE READING: ICD-10-CM

## 2021-11-10 DIAGNOSIS — L02.01 FACIAL ABSCESS: Primary | ICD-10-CM

## 2021-11-10 PROCEDURE — 6370000000 HC RX 637 (ALT 250 FOR IP): Performed by: EMERGENCY MEDICINE

## 2021-11-10 PROCEDURE — 99283 EMERGENCY DEPT VISIT LOW MDM: CPT

## 2021-11-10 RX ORDER — CEPHALEXIN 250 MG/1
500 CAPSULE ORAL ONCE
Status: COMPLETED | OUTPATIENT
Start: 2021-11-10 | End: 2021-11-10

## 2021-11-10 RX ORDER — CEPHALEXIN 500 MG/1
500 CAPSULE ORAL 4 TIMES DAILY
Qty: 40 CAPSULE | Refills: 0 | Status: SHIPPED | OUTPATIENT
Start: 2021-11-10 | End: 2021-11-20

## 2021-11-10 RX ADMIN — CEPHALEXIN 500 MG: 250 CAPSULE ORAL at 15:12

## 2021-11-10 ASSESSMENT — PAIN DESCRIPTION - PAIN TYPE: TYPE: ACUTE PAIN

## 2021-11-10 ASSESSMENT — PAIN SCALES - GENERAL: PAINLEVEL_OUTOF10: 10

## 2021-11-10 ASSESSMENT — PAIN DESCRIPTION - LOCATION: LOCATION: FACE

## 2021-11-10 NOTE — ED PROVIDER NOTES
Emergency Department Encounter  [address]    Patient: Megan Craven  MRN: [id number]  : 1985  Date of Evaluation: 11/10/2021  ED Provider: Octavio Bills MD    Chief Complaint       Chief Complaint   Patient presents with    Abscess     [address] is a 31 y.o. female who presents to the emergency department with 4 days of left focal facial abscess. There is no fever. Patient states she would like antibiotic and does not want the abscess drained. Her tetanus is up-to-date. ROS:     At least 10 systems reviewed and otherwise acutely negative except as in the [address] Past History     Past Medical History:   Diagnosis Date    Heart murmur      Past Surgical History:   Procedure Laterality Date    BRAIN SURGERY       Social History     Socioeconomic History    Marital status: Single     Spouse name: None    Number of children: None    Years of education: None    Highest education level: None   Occupational History    None   Tobacco Use    Smoking status: Current Every Day Smoker     Packs/day: 0.50     Types: Cigarettes    Smokeless tobacco: Never Used   Vaping Use    Vaping Use: Never used   Substance and Sexual Activity    Alcohol use: Yes     Comment: SOCIAL    Drug use: Yes     Types: Marijuana Etienne Mustard)    Sexual activity: None   Other Topics Concern    None   Social History Narrative    None     Social Determinants of Health     Financial Resource Strain:     Difficulty of Paying Living Expenses: Not on file   Food Insecurity:     Worried About Running Out of Food in the Last Year: Not on file    [place] of Food in the Last Year: Not on file   Transportation Needs:     Lack of Transportation (Medical): Not on file    Lack of Transportation (Non-Medical):  Not on file   Physical Activity:     Days of Exercise per Week: Not on file    Minutes of Exercise per Session: Not on file   Stress:     Feeling of Stress : Not on file   Social Connections:     Frequency of Communication with Friends and Family: Not on file    Frequency of Social Gatherings with Friends and Family: Not on file    Attends Evangelical Services: Not on file    Active Member of Clubs or Organizations: Not on file    Attends Club or Organization Meetings: Not on file    Marital Status: Not on file   Intimate Partner Violence:     Fear of Current or Ex-Partner: Not on file    Emotionally Abused: Not on file    Physically Abused: Not on file    Sexually Abused: Not on file   Housing Stability:     Unable to Pay for Housing in the Last Year: Not on file    Number of [place] in the Last Year: Not on file    Unstable Housing in the Last Year: Not on file       Medications/Allergies     Discharge Medication List as of 11/10/2021  3:09 PM      CONTINUE these medications which have NOT CHANGED    Details   desvenlafaxine succinate (PRISTIQ) 25 MG TB24 extended release tablet Take 25 mg by mouth dailyHistorical Med      lamoTRIgine (LAMICTAL) 25 MG tablet Historical Med      oxybutynin (DITROPAN-XL) 5 MG extended release tablet TAKE 1 TABLET BY MOUTH EVERY DAYHistorical Med      omeprazole (PRILOSEC) 40 MG delayed release capsule TAKE 1 CAPSULE BY MOUTH EVERY DAYHistorical Med      CITALOPRAM HYDROBROMIDE PO Take by mouthHistorical Med      ARIPiprazole (ABILIFY PO) Take by mouthHistorical Med      ibuprofen (IBU) 600 MG tablet Take 1 tablet by mouth every 6 hours as needed for Pain, Disp-120 tablet, R-0Normal           Allergies   Allergen Reactions    Codeine         Physical Exam       ED Triage Vitals   BP Temp Temp src Pulse Resp SpO2 Height Weight   11/10/21 1338 11/10/21 1336 -- 11/10/21 1336 11/10/21 1336 11/10/21 1336 11/10/21 1336 11/10/21 1336   (!) 172/115 97 °F (36.1 °C)  91 16 97 % 5' (1.524 m) 180 lb (81.6 kg)     GENERAL APPEARANCE: Awake and alert. Cooperative. No acute distress. No obvious discomfort. HEAD: Normocephalic. Atraumatic. EYES: Sclera anicteric. ENT: Tolerates saliva. No trismus. The oropharynx and oral cavity are unremarkable. At the left facial cheek inferiorly is a focal raised mildly erythematous and tender area consistent with an abscess. There is no fluctuance on palpation. It is mildly indurated. NECK: Supple. Trachea midline. CARDIO: RRR. Radial pulse 2+. No audible murmur. LUNGS: Respirations unlabored. CTAB and symmetrical..  ABDOMEN: Soft. Non-distended. Non-tender. No clubbing, edema, cyanosis. EXTREMITIES: No acute deformities. SKIN: Warm and dry. As above otherwise unremarkable. NEUROLOGICAL: No gross facial drooping. Moves all 4 extremities spontaneously. GCS 15. Cranial nerves III to XII grossly intact. Speech is clear. No gross motor or sensory deficits. PSYCHIATRIC: Normal mood. Diagnostics   Labs:  No results found for this visit on 11/10/21. Radiographs:  No results found. Procedures/EKG:   None    ED Course and MDM   In brief, Harsha Nelson is a 31 y.o. female who presented to the emergency department with reported left facial presents for about 4 days. Her tetanus is up-to-date. The patient is started on Keflex. She is to take Tylenol and/or ibuprofen as needed as directed on the container. She is to return to the emergency department for any worsening or concerning symptoms. At the time of disposition, the patient stable no acute distress. She is encouraged to follow-up with primary care provider next 1 to 3 days. ED Medication Orders (From admission, onward)    Start Ordered     Status Ordering Provider    11/10/21 1515 11/10/21 1503  cephALEXin (KEFLEX) capsule 500 mg  ONCE        Question:  Antimicrobial Indications  Answer:  Skin and Soft Tissue Infection    Last MAR action: Given - by Keith Coleman on 11/10/21 at [address], [address] [address]          Final Impression      1. Facial abscess    2.  Elevated blood pressure reading      DISPOSITION Decision To Discharge 11/10/2021 03:05:29 PM     (Please note that portions of this note may have been completed with a voice recognition program. Efforts were made to edit the dictations but occasionally words are mis-transcribed.)    Divina Snyder MD  [address], [place]  11/10/21 2211

## 2022-01-14 ENCOUNTER — HOSPITAL ENCOUNTER (EMERGENCY)
Age: 37
Discharge: HOME | End: 2022-01-14
Attending: EMERGENCY MEDICINE
Payer: COMMERCIAL

## 2022-01-14 VITALS
RESPIRATION RATE: 18 BRPM | HEART RATE: 80 BPM | TEMPERATURE: 97.9 F | SYSTOLIC BLOOD PRESSURE: 142 MMHG | BODY MASS INDEX: 33.38 KG/M2 | DIASTOLIC BLOOD PRESSURE: 108 MMHG | OXYGEN SATURATION: 96 % | WEIGHT: 170 LBS | HEIGHT: 60 IN

## 2022-01-14 DIAGNOSIS — L73.9 FOLLICULITIS: Primary | ICD-10-CM

## 2022-01-14 PROCEDURE — 6370000000 HC RX 637 (ALT 250 FOR IP): Performed by: EMERGENCY MEDICINE

## 2022-01-14 PROCEDURE — 99285 EMERGENCY DEPT VISIT HI MDM: CPT

## 2022-01-14 PROCEDURE — 87252 VIRUS INOCULATION TISSUE: CPT

## 2022-01-14 PROCEDURE — 87253 VIRUS INOCULATE TISSUE ADDL: CPT

## 2022-01-14 RX ORDER — NAPROXEN 500 MG/1
500 TABLET ORAL 2 TIMES DAILY
Qty: 60 TABLET | Refills: 0 | Status: SHIPPED | OUTPATIENT
Start: 2022-01-14 | End: 2022-10-10

## 2022-01-14 RX ORDER — DOXYCYCLINE HYCLATE 100 MG
100 TABLET ORAL 2 TIMES DAILY
Qty: 20 TABLET | Refills: 0 | Status: SHIPPED | OUTPATIENT
Start: 2022-01-14 | End: 2022-01-24

## 2022-01-14 RX ORDER — DOXYCYCLINE HYCLATE 100 MG
100 TABLET ORAL ONCE
Status: COMPLETED | OUTPATIENT
Start: 2022-01-14 | End: 2022-01-14

## 2022-01-14 RX ADMIN — DOXYCYCLINE HYCLATE 100 MG: 100 TABLET, COATED ORAL at 21:52

## 2022-01-14 ASSESSMENT — PAIN DESCRIPTION - PAIN TYPE: TYPE: ACUTE PAIN

## 2022-01-14 ASSESSMENT — PAIN DESCRIPTION - FREQUENCY: FREQUENCY: CONTINUOUS

## 2022-01-14 ASSESSMENT — PAIN DESCRIPTION - DESCRIPTORS: DESCRIPTORS: SORE

## 2022-01-14 ASSESSMENT — PAIN SCALES - GENERAL: PAINLEVEL_OUTOF10: 9

## 2022-01-14 ASSESSMENT — PAIN - FUNCTIONAL ASSESSMENT: PAIN_FUNCTIONAL_ASSESSMENT: PREVENTS OR INTERFERES SOME ACTIVE ACTIVITIES AND ADLS

## 2022-01-14 ASSESSMENT — PAIN DESCRIPTION - LOCATION: LOCATION: BUTTOCKS

## 2022-01-14 ASSESSMENT — PAIN DESCRIPTION - ORIENTATION: ORIENTATION: LEFT

## 2022-01-14 ASSESSMENT — PAIN DESCRIPTION - PROGRESSION: CLINICAL_PROGRESSION: NOT CHANGED

## 2022-01-14 ASSESSMENT — PAIN DESCRIPTION - ONSET: ONSET: ON-GOING

## 2022-01-15 NOTE — ED NOTES
Patient prepared for and ready to be discharged. Patient discharged at this time in no acute distress after verbalizing understanding of discharge instructions. Patient left after receiving After Visit Summary instructions.       Sally Waller RN  01/14/22 7639

## 2022-01-15 NOTE — ED PROVIDER NOTES
Triage Chief Complaint:   Abscess (Patient states has a bump on left buttocx (not observed at [facility]) )    [place]:  Dorian Pham is a 31 y.o. female that presents with concern regarding a lesion on her left buttock. Patient reports \"I think I might have cancer\". Patient developed a sore and lesion on her left buttock near her anus possibly 3 days ago. Lesion has become more painful and patient and  were concerned that it might be a cancer. Patient describes pain is 9 out of 10, constant and localized to this region. Patient is not sure of seeing any drainage from this area. Patient denies any fevers. Patient has never had a problem with this before. ROS:  General:  No fevers, no chills  ENT: No sore throat, no runny nose  Cardiovascular:  No chest pain, no palpitations  Respiratory:  No shortness of breath, no cough  Gastrointestinal:  No pain, no nausea, no vomiting, no diarrhea  : No pain with urinating, no increased frequency urinating  Neurologic:  No numbness, no weakness  Extremities:  No edema, no pain  Skin:  No rash, + lesion near buttock  Psych: No axienty    Past Medical History:   Diagnosis Date    Heart murmur      Past Surgical History:   Procedure Laterality Date    BRAIN SURGERY      HYSTERECTOMY       History reviewed. No pertinent family history.   Social History     Socioeconomic History    Marital status: Single     Spouse name: Not on file    Number of children: Not on file    Years of education: Not on file    Highest education level: Not on file   Occupational History    Not on file   Tobacco Use    Smoking status: Current Every Day Smoker     Packs/day: 0.50     Types: Cigarettes    Smokeless tobacco: Never Used   Vaping Use    Vaping Use: Never used   Substance and Sexual Activity    Alcohol use: Yes     Comment: SOCIAL    Drug use: Yes     Types: Marijuana Deboraha Sanes)    Sexual activity: Yes     Partners: Male   Other Topics Concern    Not on file   Social History Narrative    Not on file     Social Determinants of Health     Financial Resource Strain:     Difficulty of Paying Living Expenses: Not on file   Food Insecurity:     Worried About Running Out of Food in the Last Year: Not on file    Wyatt of Food in the Last Year: Not on file   Transportation Needs:     Lack of Transportation (Medical): Not on file    Lack of Transportation (Non-Medical): Not on file   Physical Activity:     Days of Exercise per Week: Not on file    Minutes of Exercise per Session: Not on file   Stress:     Feeling of Stress : Not on file   Social Connections:     Frequency of Communication with Friends and Family: Not on file    Frequency of Social Gatherings with Friends and Family: Not on file    Attends Hoahaoism Services: Not on file    Active Member of [address] [place] [address] [employer] or Organizations: Not on file    Attends Club or Organization Meetings: Not on file    Marital Status: Not on file   Intimate Partner Violence:     Fear of Current or Ex-Partner: Not on file    Emotionally Abused: Not on file    Physically Abused: Not on file    Sexually Abused: Not on file   Housing Stability:     Unable to Pay for Housing in the Last Year: Not on file    Number of [place] in the Last Year: Not on file    Unstable Housing in the Last Year: Not on file     No current facility-administered medications for this encounter.      Current Outpatient Medications   Medication Sig Dispense Refill    doxycycline hyclate (VIBRA-TABS) 100 MG tablet Take 1 tablet by mouth 2 times daily for 10 days 20 tablet 0    Lidocaine 4 % OINT Apply 1 g topically 2 times daily as needed (pain) 50 g 0    naproxen (NAPROSYN) 500 MG tablet Take 1 tablet by mouth 2 times daily 60 tablet 0    desvenlafaxine succinate (PRISTIQ) 25 MG TB24 extended release tablet Take 25 mg by mouth daily      lamoTRIgine (LAMICTAL) 25 MG tablet       oxybutynin (DITROPAN-XL) 5 MG extended release tablet TAKE 1 TABLET BY MOUTH EVERY DAY      omeprazole (PRILOSEC) 40 MG delayed release capsule TAKE 1 CAPSULE BY MOUTH EVERY DAY      CITALOPRAM HYDROBROMIDE PO Take by mouth      ARIPiprazole (ABILIFY PO) Take by mouth      ibuprofen (IBU) 600 MG tablet Take 1 tablet by mouth every 6 hours as needed for Pain 120 tablet 0     Allergies   Allergen Reactions    Codeine Nausea And Vomiting       Nursing Notes Reviewed    Physical Exam:  ED Triage Vitals [01/14/22 1949]   Enc Vitals Group      BP (!) 152/114      Pulse 83      Resp 18      Temp 97.9 °F (36.6 °C)      Temp Source Oral      SpO2 100 %      Weight 170 lb (77.1 kg)      Height 5' (1.524 m)      Head Circumference       Peak Flow       Pain Score       Pain Loc       Pain Edu? Excl. in [address]? My pulse ox interpretation is - normal    General appearance:  No acute distress. Sitting comfortably in bed. Skin:  Warm. Dry. Lesion as per rectal exam.  Eye:  Extraocular movements intact. Extremity:  No swelling. Normal ROM     Heart:  Regular rate and rhythm  Abdomen:  Soft. Nontender. Nondistended. No rebound or guarding. Rectal: There is a punctate approximately 3 x 3 mm well-circumscribed lesion with central open wound to the 2 o'clock position of the perianal region. There is no surrounding induration or increased warmth. No underlying fluctuance. No active or expressible drainage. This lesion is point tender to palpation. Respiratory:  Respirations nonlabored. Neurological:  Alert and oriented times 3. No focal neuro deficits. I have reviewed and interpreted all of the currently available lab results from this visit (if applicable):  No results found for this visit on 01/14/22.    Radiographs (if obtained):  [] The following radiograph was interpreted by myself in the absence of a radiologist:   [] Radiologist's Report Reviewed:  No orders to display         EKG (if obtained): (All EKG's are interpreted by myself in the absence of a cardiologist)    Chart review shows recent radiographs:  No results found. MDM:  Pt presents as above. Emergent conditions considered. Presentation prompted initial history and physical.  Wound is consistent with a likely inflamed follicle that had gotten infected and had been drained. There is no evidence of any underlying abscess at this time or a necrotizing infectious process. Wound is swabbed for HSV given the location and characterization. Results of swab is pending. I will cover patient with doxycycline with first dose given here. Additionally, lidocaine ointment and naproxen provided for symptomatic treatment. Encourage patient/counseled patient the need for follow-up with her PCP for a recheck to ensure resolution or for further testing should the lesion persist.    I discussed specific signs and symptoms and when to return to the emergency department as well as need for close outpatient follow-up. Questions sought and answered with the patient. They voice understanding and agree with plan. Care of this patient did occur during the COVID-19 pandemic. Clinical Impression:  1.  Folliculitis      Disposition referral (if applicable):  Jeff Tobar MD  [address] [facility] [address] [phone]    Schedule an appointment as soon as possible for a visit       [address]  [phone]  Today  If symptoms worsen    Disposition medications (if applicable):  Discharge Medication List as of 1/14/2022  9:47 PM      START taking these medications    Details   doxycycline hyclate (VIBRA-TABS) 100 MG tablet Take 1 tablet by mouth 2 times daily for 10 days, Disp-20 tablet, R-0Normal      Lidocaine 4 % OINT Apply 1 g topically 2 times daily as needed (pain), Disp-50 g, R-0Normal      naproxen (NAPROSYN) 500 MG tablet Take 1 tablet by mouth 2 times daily, Disp-60 tablet, R-0Normal             Comment: Please note this report has been produced using speech recognition software and may contain errors related to that system including errors in grammar, punctuation, and spelling, as well as words and phrases that may be inappropriate. If there are any questions or concerns please feel free to contact the dictating provider for clarification.          Sumit Watson MD  01/15/22 0993

## 2022-01-17 LAB
FINAL RESULT: NORMAL
PRELIMINARY: NORMAL

## 2022-02-18 ENCOUNTER — HOSPITAL ENCOUNTER (EMERGENCY)
Age: 37
Discharge: HOME | End: 2022-02-18
Attending: EMERGENCY MEDICINE
Payer: COMMERCIAL

## 2022-02-18 ENCOUNTER — APPOINTMENT (OUTPATIENT)
Dept: GENERAL RADIOLOGY | Age: 37
End: 2022-02-18
Payer: COMMERCIAL

## 2022-02-18 VITALS
HEIGHT: 60 IN | DIASTOLIC BLOOD PRESSURE: 117 MMHG | OXYGEN SATURATION: 97 % | TEMPERATURE: 97.8 F | SYSTOLIC BLOOD PRESSURE: 152 MMHG | WEIGHT: 175 LBS | HEART RATE: 88 BPM | BODY MASS INDEX: 34.36 KG/M2 | RESPIRATION RATE: 16 BRPM

## 2022-02-18 DIAGNOSIS — I10 ESSENTIAL HYPERTENSION: ICD-10-CM

## 2022-02-18 DIAGNOSIS — R07.9 CHEST PAIN, UNSPECIFIED TYPE: ICD-10-CM

## 2022-02-18 DIAGNOSIS — R10.30 LOWER ABDOMINAL PAIN: Primary | ICD-10-CM

## 2022-02-18 LAB
BACTERIA: ABNORMAL /HPF
BILIRUBIN URINE: NEGATIVE MG/DL
BLOOD, URINE: ABNORMAL
CAST TYPE: ABNORMAL /HPF
CLARITY: ABNORMAL
COLOR: YELLOW
CRYSTAL TYPE: ABNORMAL /HPF
EKG ATRIAL RATE: 91 BPM
EKG DIAGNOSIS: NORMAL
EKG P AXIS: 61 DEGREES
EKG P-R INTERVAL: 136 MS
EKG Q-T INTERVAL: 336 MS
EKG QRS DURATION: 82 MS
EKG QTC CALCULATION (BAZETT): 413 MS
EKG R AXIS: 53 DEGREES
EKG T AXIS: 52 DEGREES
EKG VENTRICULAR RATE: 91 BPM
EPITHELIAL CELLS, UA: 3 /HPF
GLUCOSE, URINE: NEGATIVE MG/DL
INTERPRETATION: NORMAL
KETONES, URINE: NEGATIVE MG/DL
LEUKOCYTE ESTERASE, URINE: NEGATIVE
NITRITE URINE, QUANTITATIVE: NEGATIVE
PH, URINE: 6 (ref 5–8)
PREGNANCY, URINE: NEGATIVE
PROTEIN UA: NEGATIVE MG/DL
RBC URINE: 5 /HPF (ref 0–6)
SPECIFIC GRAVITY UA: 1.02 (ref 1–1.03)
SPECIFIC GRAVITY, URINE: 1.02 (ref 1–1.03)
UROBILINOGEN, URINE: 0.2 MG/DL (ref 0.2–1)
WBC UA: 2 /HPF (ref 0–5)

## 2022-02-18 PROCEDURE — 81025 URINE PREGNANCY TEST: CPT

## 2022-02-18 PROCEDURE — 93005 ELECTROCARDIOGRAM TRACING: CPT | Performed by: EMERGENCY MEDICINE

## 2022-02-18 PROCEDURE — 87086 URINE CULTURE/COLONY COUNT: CPT

## 2022-02-18 PROCEDURE — 93010 ELECTROCARDIOGRAM REPORT: CPT | Performed by: INTERNAL MEDICINE

## 2022-02-18 PROCEDURE — 81001 URINALYSIS AUTO W/SCOPE: CPT

## 2022-02-18 PROCEDURE — 6370000000 HC RX 637 (ALT 250 FOR IP): Performed by: EMERGENCY MEDICINE

## 2022-02-18 PROCEDURE — 71046 X-RAY EXAM CHEST 2 VIEWS: CPT

## 2022-02-18 PROCEDURE — 99283 EMERGENCY DEPT VISIT LOW MDM: CPT

## 2022-02-18 RX ORDER — ACETAMINOPHEN 325 MG/1
650 TABLET ORAL ONCE
Status: COMPLETED | OUTPATIENT
Start: 2022-02-18 | End: 2022-02-18

## 2022-02-18 RX ADMIN — ACETAMINOPHEN 650 MG: 325 TABLET ORAL at 14:18

## 2022-02-18 ASSESSMENT — PAIN DESCRIPTION - PAIN TYPE: TYPE: ACUTE PAIN

## 2022-02-18 ASSESSMENT — PAIN SCALES - GENERAL: PAINLEVEL_OUTOF10: 9

## 2022-02-18 NOTE — ED PROVIDER NOTES
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 female that presents with complaint of bilateral lower abdominal pain over the last week, no nausea, no vomiting, no diarrhea. She has history of an overactive bladder and is on oxybutynin, so reports that she goes to the bathroom often at baseline. No increase in frequency. No dysuria. No flank pain. No fevers. She also reports she has had chest pain since she started her new job in January, it will be over the left side of her chest, sometimes when she is working or lifting things, sometimes once he is sitting. Feels like pressure and she has to put her hand over her chest and push on it to make it feel better. No associated shortness of breath. No nausea or vomiting. No diaphoresis or sweating or lightheadedness. No syncope. No leg swelling or pain. This has been ongoing for several weeks. She states she has had high blood pressure every time she sees the doctor for the last several visits, but had not been started on anything. No known history of cholesterol issues. ROS - see HPI, below listed is current ROS at time of my eval:  10 systems reviewed negative except as above      Past Medical History:   Diagnosis Date    Heart murmur      Past Surgical History:   Procedure Laterality Date    BRAIN SURGERY      HYSTERECTOMY       History reviewed. No pertinent family history.   Social History     Socioeconomic History    Marital status: Single     Spouse name: Not on file    Number of children: Not on file    Years of education: Not on file    Highest education level: Not on file   Occupational History    Not on file   Tobacco Use    Smoking status: Current Every Day Smoker     Packs/day: 0.50     Types: Cigarettes    Smokeless tobacco: Never Used   Vaping Use    Vaping Use: Never used   Substance and Sexual Activity    Alcohol use: Yes     Comment: SOCIAL    Drug use: Yes     Types: Marijuana Anushka Luna)    Sexual activity: Yes     Partners: Male   Other Topics Concern    Not on file   Social History Narrative    Not on file     Social Determinants of Health     Financial Resource Strain:     Difficulty of Paying Living Expenses: Not on file   Food Insecurity:     Worried About Running Out of Food in the Last Year: Not on file    Wyatt of Food in the Last Year: Not on file   Transportation Needs:     Lack of Transportation (Medical): Not on file    Lack of Transportation (Non-Medical): Not on file   Physical Activity:     Days of Exercise per Week: Not on file    Minutes of Exercise per Session: Not on file   Stress:     Feeling of Stress : Not on file   Social Connections:     Frequency of Communication with Friends and Family: Not on file    Frequency of Social Gatherings with Friends and Family: Not on file    Attends Christianity Services: Not on file    Active Member of [address] [place] [address] or Organizations: Not on file    Attends Club or Organization Meetings: Not on file    Marital Status: Not on file   Intimate Partner Violence:     Fear of Current or Ex-Partner: Not on file    Emotionally Abused: Not on file    Physically Abused: Not on file    Sexually Abused: Not on file   Housing Stability:     Unable to Pay for Housing in the Last Year: Not on file    Number of [place] in the Last Year: Not on file    Unstable Housing in the Last Year: Not on file     No current facility-administered medications for this encounter.      Current Outpatient Medications   Medication Sig Dispense Refill    Lidocaine 4 % OINT Apply 1 g topically 2 times daily as needed (pain) 50 g 0    naproxen (NAPROSYN) 500 MG tablet Take 1 tablet by mouth 2 times daily 60 tablet 0    desvenlafaxine succinate (PRISTIQ) 25 MG TB24 extended release tablet Take 25 mg by mouth daily      lamoTRIgine (LAMICTAL) 25 MG tablet       oxybutynin (DITROPAN-XL) 5 MG extended release tablet TAKE 1 TABLET BY MOUTH EVERY DAY      omeprazole (PRILOSEC) 40 MG delayed release capsule TAKE 1 CAPSULE BY MOUTH EVERY DAY      CITALOPRAM HYDROBROMIDE PO Take by mouth      ARIPiprazole (ABILIFY PO) Take by mouth      ibuprofen (IBU) 600 MG tablet Take 1 tablet by mouth every 6 hours as needed for Pain 120 tablet 0     Allergies   Allergen Reactions    Codeine Nausea And Vomiting       Nursing Notes Reviewed    Physical Exam:  Triage VS:    ED Triage Vitals   Enc Vitals Group      BP 02/18/22 1340 (!) 152/117      Pulse 02/18/22 1342 88      Resp 02/18/22 1340 16      Temp 02/18/22 1340 97.8 °F (36.6 °C)      Temp src --       SpO2 02/18/22 1342 97 %      Weight 02/18/22 1340 175 lb (79.4 kg)      Height 02/18/22 1340 5' (1.524 m)      Head Circumference --       Peak Flow --       Pain Score --       Pain Loc --       Pain Edu? --       Excl. in [address]? --        My pulse ox interpretation is - normal    General appearance:  No acute distress. Skin:  Warm. Dry. Eye:  Extraocular movements intact. Ears, nose, mouth and throat:  Oral mucosa moist   Neck:  Trachea midline. Extremity:  No swelling. Normal ROM     Heart:  Regular rate and rhythm, normal S1 & S2, no extra heart sounds. Perfusion:  intact  Respiratory:  Lungs clear to auscultation bilaterally. Respirations nonlabored. Abdominal:  Normal bowel sounds. Soft. Nontender. Non distended.   Neurological:  Alert and oriented          Psychiatric:  Appropriate    I have reviewed and interpreted all of the currently available lab results from this visit (if applicable):  Results for orders placed or performed during the hospital encounter of 02/18/22   Urinalysis   Result Value Ref Range    Color, UA YELLOW YELLOW    Clarity, UA CLOUDY (A) CLEAR    Glucose, Urine NEGATIVE NEGATIVE MG/DL    Bilirubin Urine NEGATIVE NEGATIVE MG/DL    Ketones, Urine NEGATIVE NEGATIVE MG/DL    Specific Gravity, UA 1.025 1.001 - 1.035    Blood, Urine SMALL NUMBER OR AMOUNT OBSERVED (A) NEGATIVE    pH, Urine 6.0 5.0 - 8.0    Protein, UA NEGATIVE NEGATIVE MG/DL    Urobilinogen, Urine 0.2 0.2 - 1.0 MG/DL    Nitrite Urine, Quantitative NEGATIVE NEGATIVE    Leukocyte Esterase, Urine NEGATIVE NEGATIVE    RBC, UA 5 0 - 6 /HPF    WBC, UA 2 0 - 5 /HPF    Epithelial Cells, UA 3 /HPF    Cast Type NO CAST FORMS SEEN NO CAST FORMS SEEN /HPF    Bacteria, UA MANY (A) NEGATIVE /HPF    Crystal Type NONE SEEN (A) NEGATIVE /HPF   Urine HCG, if >12yrs and premenopausal   Result Value Ref Range    Pregnancy, Urine NEGATIVE NEGATIVE    Specific Gravity, Urine 1.025 1.001 - 1.035    Interpretation HCG METHOD LIMITATIONS:       Radiographs (if obtained):  Radiologist's Report Reviewed:  No results found. EKG (if obtained): (All EKG's are interpreted by myself in the absence of a cardiologist)  Normal sinus rhythm with sinus arrhythmia, rate of 91 bpm, normal intervals. No ST elevation. Normal EKG. No previous to compare      MDM:  72-year-old female with history as above presents with concern for lower abdominal pain, for the last week or so, no specific rebound or guarding on my exam.  Urine done, does not have any leukocyte esterase or nitrites to suggest infection, multiple epithelial cells, we will send culture but as she is not having frequency or urgency would not treat for infection at this time with no evidence of other evidence of infection. EKG and chest x-ray were obtained given her frequent episodes of chest pain over the last several weeks. They do sound more musculoskeletal in nature, she has no risk factors for PE,  and she has been under a lot of stress, that she related to me we were talking more. Her EKG and her chest x-ray are reassuring.   Plan for discharge home with close follow-up with her PCP. We did discuss that her blood pressure was elevated here, she tells me it is typically a lot higher than 150, I encouraged her to have close follow-up regarding this and recheck, likely needs to be started on medication. She is comfortable with this plan, discharged in stable condition, given strict return precautions    Clinical Impression:  1. Lower abdominal pain    2. Chest pain, unspecified type    3. Essential hypertension      Disposition referral (if applicable):  Vivi De Los Santos MD  [address] [facility] [place] [place] [facility] [address]  [phone]    Schedule an appointment as soon as possible for a visit       [facility]  [address]  [phone]    If symptoms worsen    Disposition medications (if applicable):  Discharge Medication List as of 2/18/2022  2:35 PM        ED Provider Disposition Time  DISPOSITION Decision To Discharge 02/18/2022 02:33:40 PM      Comment: Please note this report has been produced using speech recognition software and may contain errors related to that system including errors in grammar, punctuation, and spelling, as well as words and phrases that may be inappropriate. Efforts were made to edit the dictations.        Golden Bolaños MD  02/18/22 5631

## 2022-02-18 NOTE — ED NOTES
Patient discharged with no new rx. Patient verbalized understanding of discharge instructions and follow up care.       Pema Oakes RN  02/18/22 7087

## 2022-02-19 LAB
CULTURE: NORMAL
Lab: NORMAL
SPECIMEN: NORMAL

## 2022-10-10 ENCOUNTER — APPOINTMENT (OUTPATIENT)
Dept: GENERAL RADIOLOGY | Age: 37
End: 2022-10-10
Payer: COMMERCIAL

## 2022-10-10 ENCOUNTER — HOSPITAL ENCOUNTER (EMERGENCY)
Age: 37
Discharge: HOME | End: 2022-10-10
Attending: EMERGENCY MEDICINE
Payer: COMMERCIAL

## 2022-10-10 VITALS
DIASTOLIC BLOOD PRESSURE: 94 MMHG | TEMPERATURE: 97.2 F | OXYGEN SATURATION: 96 % | RESPIRATION RATE: 16 BRPM | BODY MASS INDEX: 31.81 KG/M2 | SYSTOLIC BLOOD PRESSURE: 140 MMHG | WEIGHT: 162.9 LBS | HEART RATE: 92 BPM

## 2022-10-10 DIAGNOSIS — M25.532 LEFT WRIST PAIN: Primary | ICD-10-CM

## 2022-10-10 PROCEDURE — 73110 X-RAY EXAM OF WRIST: CPT

## 2022-10-10 PROCEDURE — 99283 EMERGENCY DEPT VISIT LOW MDM: CPT

## 2022-10-10 NOTE — ED PROVIDER NOTES
Triage Chief Complaint:   Wrist Pain (Left NKI)      [place]:  Luiz Pineda is a 30 y.o. female that presents to the emergency department with left wrist pain. Patient states this has been going on for about a week. She states she broke this wrist about 2 years ago and did have surgery. This time she does not recall any new injury. She states she has been wearing a brace on her wrist which seems to help when she can wear it. She denies any numbness or tingling. Multiple people told her this could be carpal tunnel so she came to the emergency department. She denies that this pain causes any numbness or tingling. She does not have pain with repetitive motion. .    Past Medical History:   Diagnosis Date    Heart murmur      Past Surgical History:   Procedure Laterality Date    BRAIN SURGERY      HYSTERECTOMY (CERVIX STATUS UNKNOWN)       History reviewed. No pertinent family history. Social History     Socioeconomic History    Marital status: Single     Spouse name: Not on file    Number of children: Not on file    Years of education: Not on file    Highest education level: Not on file   Occupational History    Not on file   Tobacco Use    Smoking status: Every Day     Packs/day: 0.50     Types: Cigarettes    Smokeless tobacco: Never   Vaping Use    Vaping Use: Never used   Substance and Sexual Activity    Alcohol use: Yes     Comment: SOCIAL    Drug use: Yes     Types: Marijuana Beni Browne     Comment: occ    Sexual activity: Yes     Partners: Male   Other Topics Concern    Not on file   Social History Narrative    Not on file     Social Determinants of Health     Financial Resource Strain: Not on file   Food Insecurity: Not on file   Transportation Needs: Not on file   Physical Activity: Not on file   Stress: Not on file   Social Connections: Not on file   Intimate Partner Violence: Not on file   Housing Stability: Not on file     No current facility-administered medications for this encounter.      No current outpatient medications on file. Allergies   Allergen Reactions    Codeine Nausea And Vomiting     Nursing Notes Reviewed    ROS:  At least 10 systems reviewed and otherwise negative except as in the [address] Physical Exam:  ED Triage Vitals [10/10/22 1612]   Enc Vitals Group      BP (!) 140/94      Heart Rate 92      Resp 16      Temp 97.2 °F (36.2 °C)      Temp src       SpO2 96 %      Weight 162 lb 14.4 oz (73.9 kg)      Height       Head Circumference       Peak Flow       Pain Score       Pain Loc       Pain Edu? Excl. in [address]? My pulse oximetry interpretation is which is within the normal range    GENERAL APPEARANCE: Awake and alert. Cooperative. No acute distress. HEAD:  Atraumatic. EYES: EOM's grossly intact. ENT: Mucous membranes are moist.  No trismus. NECK:  Trachea midline. EXTREMITIES: No acute deformities. Specifically of the left wrist no swelling, bruising, crepitus. Good radial pulse. Normal sensation and movement of the fingers. Negative tinel sign. SKIN: Warm and dry. NEUROLOGICAL: No gross facial drooping. Moves all 4 extremities spontaneously. PSYCHIATRIC: Normal mood. I have reviewed and interpreted all of the currently available lab results from this visit (if applicable):  No results found for this visit on 10/10/22. EKG: (All EKG's are interpreted by myself in the absence of a cardiologist)      MDM:  X-ray of the head shows no acute abnormalities. I have low suspicion for carpal tunnel as she has no numbness or tingling and I cannot reproduce this either. Patient has no direct trauma. I do not appreciate any obvious abnormalities. Likely musculoskeletal versus inflammation. I recommended that she continue to wear the brace, use anti-inflammatory medication or Tylenol as needed. If symptoms continue she will follow-up with primary care physician. Clinical Impression:  1.  Left wrist pain        Disposition Vitals:  [unfilled], [unfilled], [unfilled], [unfilled]    Disposition referral (if applicable):  Joan Marie MD  [address] [address] [phone]    Schedule an appointment as soon as possible for a visit   If symptoms worsen    Disposition medications (if applicable):  New Prescriptions    No medications on file         (Please note that portions of this note may have been completed with a voice recognition program. Efforts were made to edit the dictations but occasionally words are mis-transcribed.)    MD Casey Connell MD  10/10/22 1926

## 2023-05-18 ENCOUNTER — HOSPITAL ENCOUNTER (EMERGENCY)
Age: 38
Discharge: HOME | End: 2023-05-18
Attending: EMERGENCY MEDICINE
Payer: COMMERCIAL

## 2023-05-18 VITALS
HEART RATE: 81 BPM | RESPIRATION RATE: 16 BRPM | WEIGHT: 165.8 LBS | HEIGHT: 60 IN | DIASTOLIC BLOOD PRESSURE: 97 MMHG | SYSTOLIC BLOOD PRESSURE: 152 MMHG | BODY MASS INDEX: 32.55 KG/M2 | TEMPERATURE: 97.9 F | OXYGEN SATURATION: 97 %

## 2023-05-18 DIAGNOSIS — H66.001 ACUTE SUPPURATIVE OTITIS MEDIA OF RIGHT EAR WITHOUT SPONTANEOUS RUPTURE OF TYMPANIC MEMBRANE, RECURRENCE NOT SPECIFIED: Primary | ICD-10-CM

## 2023-05-18 PROCEDURE — 99283 EMERGENCY DEPT VISIT LOW MDM: CPT

## 2023-05-18 RX ORDER — GUAIFENESIN AND PSEUDOEPHEDRINE HCL 1200; 120 MG/1; MG/1
1 TABLET, EXTENDED RELEASE ORAL EVERY 12 HOURS PRN
Qty: 20 TABLET | Refills: 0 | Status: SHIPPED | OUTPATIENT
Start: 2023-05-18

## 2023-05-18 RX ORDER — NAPROXEN 500 MG/1
500 TABLET ORAL 2 TIMES DAILY
Qty: 20 TABLET | Refills: 0 | Status: SHIPPED | OUTPATIENT
Start: 2023-05-18

## 2023-05-18 RX ORDER — AMOXICILLIN 500 MG/1
500 CAPSULE ORAL 3 TIMES DAILY
Qty: 30 CAPSULE | Refills: 0 | Status: SHIPPED | OUTPATIENT
Start: 2023-05-18 | End: 2023-05-28

## 2023-05-18 RX ORDER — CLONIDINE HYDROCHLORIDE 0.1 MG/1
0.1 TABLET ORAL NIGHTLY
COMMUNITY

## 2023-05-18 ASSESSMENT — PAIN DESCRIPTION - ORIENTATION: ORIENTATION: LEFT

## 2023-05-18 ASSESSMENT — PAIN - FUNCTIONAL ASSESSMENT: PAIN_FUNCTIONAL_ASSESSMENT: 0-10

## 2023-05-18 ASSESSMENT — PAIN DESCRIPTION - LOCATION: LOCATION: EAR

## 2023-05-18 ASSESSMENT — PAIN SCALES - GENERAL: PAINLEVEL_OUTOF10: 8

## 2023-05-18 NOTE — ED PROVIDER NOTES
Emergency Department Encounter  Location: [address] [place] [address]    Patient: Nicola Solorzano  MRN: [id number]  : 1985  Date of evaluation: 2023  ED Provider: Venus Noel DO, FACEP    Chief Complaint:    Otalgia (LEFT FOR 1 WEEK)    [place]:  Nicola Solorzano is a 41 y.o. female that presents to the emergency department with complaints of pain in her left ear x1 week. She is using over-the-counter eardrops with minimal relief. She denies other associated symptoms. She has not had a history of ear infections recently. ROS:  At least 4 systems reviewed and otherwise acutely negative except as in the [address] Negative for fever or chills  Negative for chest pain  Negative for shortness of breath  Negative for nausea vomiting diarrhea or constipation    Past Medical History:   Diagnosis Date    Heart murmur      Past Surgical History:   Procedure Laterality Date    BRAIN SURGERY      HYSTERECTOMY (CERVIX STATUS UNKNOWN)       History reviewed. No pertinent family history.   Social History     Socioeconomic History    Marital status: Single     Spouse name: Not on file    Number of children: Not on file    Years of education: Not on file    Highest education level: Not on file   Occupational History    Not on file   Tobacco Use    Smoking status: Every Day     Packs/day: 0.50     Types: Cigarettes    Smokeless tobacco: Never   Vaping Use    Vaping Use: Never used   Substance and Sexual Activity    Alcohol use: Yes     Comment: SOCIAL    Drug use: Yes     Types: Marijuana Seabron Barban)     Comment: occ    Sexual activity: Yes     Partners: Male   Other Topics Concern    Not on file   Social History Narrative    Not on file     Social Determinants of Health     Financial Resource Strain: Not on file   Food Insecurity: Not on file   Transportation Needs: Not on file   Physical Activity: Not on file   Stress: Not on file   Social Connections: Not on file   Intimate Partner Violence: Not on

## 2023-06-23 ENCOUNTER — HOSPITAL ENCOUNTER (EMERGENCY)
Age: 38
Discharge: HOME | End: 2023-06-23
Attending: EMERGENCY MEDICINE
Payer: COMMERCIAL

## 2023-06-23 ENCOUNTER — APPOINTMENT (OUTPATIENT)
Dept: CT IMAGING | Age: 38
End: 2023-06-23
Payer: COMMERCIAL

## 2023-06-23 VITALS
BODY MASS INDEX: 34.36 KG/M2 | RESPIRATION RATE: 14 BRPM | WEIGHT: 175 LBS | TEMPERATURE: 98 F | OXYGEN SATURATION: 97 % | SYSTOLIC BLOOD PRESSURE: 130 MMHG | DIASTOLIC BLOOD PRESSURE: 93 MMHG | HEART RATE: 67 BPM | HEIGHT: 60 IN

## 2023-06-23 DIAGNOSIS — R10.32 ABDOMINAL PAIN, LEFT LOWER QUADRANT: Primary | ICD-10-CM

## 2023-06-23 LAB
ALBUMIN SERPL-MCNC: 4.3 GM/DL (ref 3.4–5)
ALP BLD-CCNC: 69 IU/L (ref 40–129)
ALT SERPL-CCNC: 22 U/L (ref 10–40)
ANION GAP SERPL CALCULATED.3IONS-SCNC: 11 MMOL/L (ref 4–16)
AST SERPL-CCNC: 18 IU/L (ref 15–37)
BACTERIA: NEGATIVE /HPF
BASOPHILS ABSOLUTE: 0.1 K/CU MM
BASOPHILS RELATIVE PERCENT: 0.8 % (ref 0–1)
BILIRUB SERPL-MCNC: 0.3 MG/DL (ref 0–1)
BILIRUBIN URINE: NEGATIVE MG/DL
BLOOD, URINE: ABNORMAL
BUN SERPL-MCNC: 9 MG/DL (ref 6–23)
CALCIUM SERPL-MCNC: 9.9 MG/DL (ref 8.3–10.6)
CAST TYPE: NORMAL /HPF
CHLORIDE BLD-SCNC: 102 MMOL/L (ref 99–110)
CLARITY: CLEAR
CO2: 24 MMOL/L (ref 21–32)
COLOR: YELLOW
CREAT SERPL-MCNC: 0.9 MG/DL (ref 0.6–1.1)
CRYSTAL TYPE: NEGATIVE /HPF
DIFFERENTIAL TYPE: ABNORMAL
EOSINOPHILS ABSOLUTE: 0.1 K/CU MM
EOSINOPHILS RELATIVE PERCENT: 0.6 % (ref 0–3)
EPITHELIAL CELLS, UA: 2 /HPF
GFR SERPL CREATININE-BSD FRML MDRD: >60 ML/MIN/1.73M2
GLUCOSE SERPL-MCNC: 87 MG/DL (ref 70–99)
GLUCOSE, URINE: NEGATIVE MG/DL
HCT VFR BLD CALC: 44.1 % (ref 37–47)
HEMOGLOBIN: 14.9 GM/DL (ref 12.5–16)
IMMATURE NEUTROPHIL %: 0.2 % (ref 0–0.43)
KETONES, URINE: NEGATIVE MG/DL
LEUKOCYTE ESTERASE, URINE: NEGATIVE
LIPASE: 21 IU/L (ref 13–60)
LYMPHOCYTES ABSOLUTE: 1.9 K/CU MM
LYMPHOCYTES RELATIVE PERCENT: 20 % (ref 24–44)
MCH RBC QN AUTO: 31.4 PG (ref 27–31)
MCHC RBC AUTO-ENTMCNC: 33.8 % (ref 32–36)
MCV RBC AUTO: 93 FL (ref 78–100)
MONOCYTES ABSOLUTE: 0.5 K/CU MM
MONOCYTES RELATIVE PERCENT: 5.7 % (ref 0–4)
NITRITE URINE, QUANTITATIVE: NEGATIVE
PDW BLD-RTO: 13.1 % (ref 11.7–14.9)
PH, URINE: 6 (ref 5–8)
PLATELET # BLD: 329 K/CU MM (ref 140–440)
PMV BLD AUTO: 9.7 FL (ref 7.5–11.1)
POTASSIUM SERPL-SCNC: 4.4 MMOL/L (ref 3.5–5.1)
PROTEIN UA: NEGATIVE MG/DL
RBC # BLD: 4.74 M/CU MM (ref 4.2–5.4)
RBC URINE: 2 /HPF (ref 0–6)
SEGMENTED NEUTROPHILS ABSOLUTE COUNT: 6.9 K/CU MM
SEGMENTED NEUTROPHILS RELATIVE PERCENT: 72.7 % (ref 36–66)
SODIUM BLD-SCNC: 137 MMOL/L (ref 135–145)
SPECIFIC GRAVITY UA: 1.01 (ref 1–1.03)
TOTAL IMMATURE NEUTOROPHIL: 0.02 K/CU MM
TOTAL PROTEIN: 7.2 GM/DL (ref 6.4–8.2)
UROBILINOGEN, URINE: 0.2 MG/DL (ref 0.2–1)
WBC # BLD: 9.5 K/CU MM (ref 4–10.5)
WBC UA: 1 /HPF (ref 0–5)

## 2023-06-23 PROCEDURE — 6360000004 HC RX CONTRAST MEDICATION: Performed by: EMERGENCY MEDICINE

## 2023-06-23 PROCEDURE — 83690 ASSAY OF LIPASE: CPT

## 2023-06-23 PROCEDURE — 85025 COMPLETE CBC W/AUTO DIFF WBC: CPT

## 2023-06-23 PROCEDURE — 81001 URINALYSIS AUTO W/SCOPE: CPT

## 2023-06-23 PROCEDURE — 74177 CT ABD & PELVIS W/CONTRAST: CPT

## 2023-06-23 PROCEDURE — 99285 EMERGENCY DEPT VISIT HI MDM: CPT

## 2023-06-23 PROCEDURE — 80053 COMPREHEN METABOLIC PANEL: CPT

## 2023-06-23 RX ADMIN — IOPAMIDOL 75 ML: 755 INJECTION, SOLUTION INTRAVENOUS at 15:19

## 2023-06-23 ASSESSMENT — PAIN DESCRIPTION - ORIENTATION: ORIENTATION: LEFT;LOWER

## 2023-06-23 ASSESSMENT — PAIN DESCRIPTION - LOCATION: LOCATION: ABDOMEN

## 2023-06-23 ASSESSMENT — PAIN DESCRIPTION - FREQUENCY: FREQUENCY: CONTINUOUS

## 2023-06-23 ASSESSMENT — PAIN - FUNCTIONAL ASSESSMENT: PAIN_FUNCTIONAL_ASSESSMENT: 0-10

## 2023-06-23 ASSESSMENT — PAIN SCALES - GENERAL: PAINLEVEL_OUTOF10: 9

## 2023-06-23 ASSESSMENT — PAIN DESCRIPTION - DESCRIPTORS: DESCRIPTORS: ACHING

## 2023-06-23 NOTE — ED NOTES
This nurse to room to discharge patient, however patient no longer present.      Randall Sanchez RN  06/23/23 0607

## 2023-06-23 NOTE — DISCHARGE INSTRUCTIONS
Follow-up with primary care physician in 3 days for reevaluation. Call for an appointment  Take Tylenol alternating with Motrin for any pain aches and fevers  Return to the emergency department immediately pain fever chills nausea vomiting dizzy lightheadedness or any worsening symptoms.

## 2023-06-23 NOTE — ED PROVIDER NOTES
Emergency Department Encounter    Patient: Favian Coronado  MRN: [id number]  : 1985  Date of Evaluation: 2023  ED Provider:  Buddy Smith DO    Triage Chief Complaint:   Abdominal Pain (Left side abdominal pain that started 2 days ago.)    [place]:  Favian Coronado is a 41 y.o. female with no chronic medical and that presents to the emergency department complaints of left lower abdominal pain has been going on for last couple days. She states worse with walking sitting standing. Pay states never had it before. Patient states pain 9 out of 10 on the pain scale. Patient [place] any nausea vomiting diarrhea no dysuria hematuria no fever chills cough no sick contacts. Patient denies any history of irritable bowel Crohn's disease also colitis or kidney stones. Denies any history UTI. Patient that she has had ovarian cyst before. Patient denies any vaginal discharge itching odors or concern for any STDs. Patient here for evaluation.     ROS - see HPI, below listed is current ROS at time of my eval:  General:  No fevers, no chills, no weakness  Eyes:  No recent vison changes, no discharge  ENT:  No sore throat, no nasal congestion, no hearing changes  Cardiovascular:  No chest pain, no palpitations  Respiratory:  No shortness of breath, no cough, no wheezing  Gastrointestinal: Positive for left-sided abdominal pain, no nausea, no vomiting, no diarrhea  Musculoskeletal:  No muscle pain, no joint pain  Skin:  No rash, no pruritis, no easy bruising  Neurologic:  No speech problems, no headache, no extremity numbness, no extremity tingling, no extremity weakness  Psychiatric:  No anxiety  Genitourinary:  No dysuria, no hematuria  Endocrine:  No unexpected weight gain, no unexpected weight loss  Extremities:  no edema, no pain    Past Medical History:   Diagnosis Date    Heart murmur      Past Surgical History:   Procedure Laterality Date    BRAIN SURGERY      HYSTERECTOMY (CERVIX STATUS

## 2024-06-20 ENCOUNTER — HOSPITAL ENCOUNTER (EMERGENCY)
Age: 39
Discharge: HOME | End: 2024-06-20
Attending: EMERGENCY MEDICINE
Payer: COMMERCIAL

## 2024-06-20 VITALS
OXYGEN SATURATION: 97 % | HEART RATE: 86 BPM | WEIGHT: 140 LBS | RESPIRATION RATE: 18 BRPM | SYSTOLIC BLOOD PRESSURE: 168 MMHG | TEMPERATURE: 97.7 F | BODY MASS INDEX: 27.48 KG/M2 | DIASTOLIC BLOOD PRESSURE: 116 MMHG | HEIGHT: 60 IN

## 2024-06-20 DIAGNOSIS — T16.1XXA FOREIGN BODY OF RIGHT EAR, INITIAL ENCOUNTER: Primary | ICD-10-CM

## 2024-06-20 PROCEDURE — 6370000000 HC RX 637 (ALT 250 FOR IP): Performed by: EMERGENCY MEDICINE

## 2024-06-20 PROCEDURE — 69200 CLEAR OUTER EAR CANAL: CPT

## 2024-06-20 PROCEDURE — 99283 EMERGENCY DEPT VISIT LOW MDM: CPT

## 2024-06-20 RX ORDER — NEOMYCIN SULFATE, POLYMYXIN B SULFATE AND HYDROCORTISONE 10; 3.5; 1 MG/ML; MG/ML; [USP'U]/ML
3 SUSPENSION/ DROPS AURICULAR (OTIC) 4 TIMES DAILY
Qty: 10 ML | Refills: 0 | Status: SHIPPED | OUTPATIENT
Start: 2024-06-20 | End: 2024-06-25

## 2024-06-20 RX ORDER — HYDROCODONE BITARTRATE AND ACETAMINOPHEN 5; 325 MG/1; MG/1
1 TABLET ORAL ONCE
Status: COMPLETED | OUTPATIENT
Start: 2024-06-20 | End: 2024-06-20

## 2024-06-20 RX ORDER — CIPROFLOXACIN HYDROCHLORIDE 3.5 MG/ML
1 SOLUTION/ DROPS TOPICAL
Status: DISCONTINUED | OUTPATIENT
Start: 2024-06-20 | End: 2024-06-20

## 2024-06-20 RX ORDER — CIPROFLOXACIN HYDROCHLORIDE 3.5 MG/ML
2 SOLUTION/ DROPS TOPICAL ONCE
Status: DISCONTINUED | OUTPATIENT
Start: 2024-06-20 | End: 2024-06-20 | Stop reason: HOSPADM

## 2024-06-20 RX ORDER — IBUPROFEN 400 MG/1
600 TABLET ORAL ONCE
Status: COMPLETED | OUTPATIENT
Start: 2024-06-20 | End: 2024-06-20

## 2024-06-20 RX ADMIN — IBUPROFEN 600 MG: 400 TABLET, FILM COATED ORAL at 03:09

## 2024-06-20 RX ADMIN — HYDROCODONE BITARTRATE AND ACETAMINOPHEN 1 TABLET: 5; 325 TABLET ORAL at 03:09

## 2024-06-20 ASSESSMENT — PAIN DESCRIPTION - LOCATION
LOCATION: EYE
LOCATION: EAR

## 2024-06-20 ASSESSMENT — PAIN - FUNCTIONAL ASSESSMENT
PAIN_FUNCTIONAL_ASSESSMENT: 0-10
PAIN_FUNCTIONAL_ASSESSMENT: 0-10
PAIN_FUNCTIONAL_ASSESSMENT: ACTIVITIES ARE NOT PREVENTED
PAIN_FUNCTIONAL_ASSESSMENT: ACTIVITIES ARE NOT PREVENTED

## 2024-06-20 ASSESSMENT — PAIN SCALES - GENERAL
PAINLEVEL_OUTOF10: 5
PAINLEVEL_OUTOF10: 5

## 2024-06-20 ASSESSMENT — PAIN DESCRIPTION - ORIENTATION: ORIENTATION: RIGHT

## 2024-06-20 ASSESSMENT — PAIN DESCRIPTION - DESCRIPTORS
DESCRIPTORS: DISCOMFORT
DESCRIPTORS: ACHING

## 2024-06-20 ASSESSMENT — ENCOUNTER SYMPTOMS
GASTROINTESTINAL NEGATIVE: 1
RESPIRATORY NEGATIVE: 1
EYES NEGATIVE: 1

## 2024-06-20 NOTE — ED PROVIDER NOTES
The history is provided by the patient.   Foreign Body  Location:  R ear  Suspected object:  Insect  Pain quality:  Aching, dull and pressure  Pain severity:  Severe  Timing:  Constant  Progression:  Unchanged  Chronicity:  New  Worsened by:  Nothing  Ineffective treatments: Patient seen at [facility] and was told that she had to go to an ENT doctor.  Associated symptoms comment:  Patient was at the other emergency department and the bug cannot be removed for told her that it was adhered to her eardrum and that she had to see an ENT doctor.  Patient came to our facility because she wanted a \"second opinion\"      Review of Systems   Constitutional: Negative.    HENT: Negative.     Eyes: Negative.    Respiratory: Negative.     Cardiovascular: Negative.    Gastrointestinal: Negative.    Genitourinary: Negative.    Musculoskeletal: Negative.    Skin: Negative.    Neurological: Negative.    All other systems reviewed and are negative.      History reviewed. No pertinent family history.  Social History     Socioeconomic History    Marital status: Single     Spouse name: Not on file    Number of children: Not on file    Years of education: Not on file    Highest education level: Not on file   Occupational History    Not on file   Tobacco Use    Smoking status: Every Day     Current packs/day: 0.50     Types: Cigarettes    Smokeless tobacco: Never   Vaping Use    Vaping Use: Never used   Substance and Sexual Activity    Alcohol use: Yes     Comment: SOCIAL    Drug use: Yes     Types: Marijuana (Weed)     Comment: occ    Sexual activity: Yes     Partners: Male   Other Topics Concern    Not on file   Social History Narrative    Not on file     Social Determinants of Health     Financial Resource Strain: Not on file   Food Insecurity: Not on file   Transportation Needs: Not on file   Physical Activity: Not on file   Stress: Not on file   Social Connections: Not on file   Intimate Partner Violence: Not on file   Housing Stability: